# Patient Record
Sex: FEMALE | Race: WHITE | Employment: PART TIME | URBAN - METROPOLITAN AREA
[De-identification: names, ages, dates, MRNs, and addresses within clinical notes are randomized per-mention and may not be internally consistent; named-entity substitution may affect disease eponyms.]

---
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
## 2019-07-03 PROBLEM — R59.0 AXILLARY LYMPHADENOPATHY: Status: ACTIVE | Noted: 2019-07-03

## 2019-07-03 PROBLEM — M79.621 PAIN IN RIGHT AXILLA: Status: ACTIVE | Noted: 2018-05-07

## 2019-12-23 PROBLEM — R55 VASOVAGAL SYNCOPE: Status: ACTIVE | Noted: 2019-12-23

## 2020-01-07 PROBLEM — M21.862 GASTROCNEMIUS EQUINUS, LEFT: Status: ACTIVE | Noted: 2020-01-07

## 2020-01-07 PROBLEM — M21.861 GASTROCNEMIUS EQUINUS, RIGHT: Status: ACTIVE | Noted: 2020-01-07

## 2020-01-07 PROBLEM — M66.872 NONTRAUMATIC TEAR OF TIBIALIS POSTERIOR TENDON, LEFT: Status: ACTIVE | Noted: 2020-01-07

## 2020-01-07 PROBLEM — M66.871 NONTRAUMATIC TEAR OF TIBIALIS POSTERIOR TENDON, RIGHT: Status: ACTIVE | Noted: 2020-01-07

## 2021-05-17 PROBLEM — B36.0 TINEA VERSICOLOR: Status: ACTIVE | Noted: 2021-05-17

## 2022-01-07 PROBLEM — R42 SPELL OF DIZZINESS: Status: ACTIVE | Noted: 2022-01-07

## 2022-01-07 PROBLEM — L70.9 ACNE: Status: ACTIVE | Noted: 2022-01-07

## 2022-03-18 PROBLEM — R59.0 AXILLARY LYMPHADENOPATHY: Status: ACTIVE | Noted: 2019-07-03

## 2022-03-18 PROBLEM — M79.673 FOOT PAIN: Status: ACTIVE | Noted: 2018-05-07

## 2022-03-18 PROBLEM — Z00.00 ENCOUNTER FOR ANNUAL PHYSICAL EXAM: Status: ACTIVE | Noted: 2018-05-07

## 2022-03-18 PROBLEM — M66.871 NONTRAUMATIC TEAR OF TIBIALIS POSTERIOR TENDON, RIGHT: Status: ACTIVE | Noted: 2020-01-07

## 2022-03-18 PROBLEM — M62.462 GASTROCNEMIUS EQUINUS, LEFT: Status: ACTIVE | Noted: 2020-01-07

## 2022-03-18 PROBLEM — M79.621 PAIN IN RIGHT AXILLA: Status: ACTIVE | Noted: 2018-05-07

## 2022-03-18 PROBLEM — R55 VASOVAGAL SYNCOPE: Status: ACTIVE | Noted: 2019-12-23

## 2022-03-19 PROBLEM — M66.872 NONTRAUMATIC TEAR OF TIBIALIS POSTERIOR TENDON, LEFT: Status: ACTIVE | Noted: 2020-01-07

## 2022-03-19 PROBLEM — M21.40 PES PLANUS: Status: ACTIVE | Noted: 2018-05-07

## 2022-03-19 PROBLEM — M62.461 GASTROCNEMIUS EQUINUS, RIGHT: Status: ACTIVE | Noted: 2020-01-07

## 2022-03-19 PROBLEM — L70.9 ACNE: Status: ACTIVE | Noted: 2022-01-07

## 2022-03-19 PROBLEM — Z30.9 CONTRACEPTIVE MANAGEMENT: Status: ACTIVE | Noted: 2018-05-07

## 2022-03-20 PROBLEM — B36.0 TINEA VERSICOLOR: Status: ACTIVE | Noted: 2021-05-17

## 2022-03-20 PROBLEM — R42 SPELL OF DIZZINESS: Status: ACTIVE | Noted: 2022-01-07

## 2022-11-17 ENCOUNTER — OFFICE VISIT (OUTPATIENT)
Dept: FAMILY MEDICINE CLINIC | Age: 22
End: 2022-11-17
Payer: COMMERCIAL

## 2022-11-17 VITALS
HEART RATE: 89 BPM | HEIGHT: 60 IN | RESPIRATION RATE: 18 BRPM | TEMPERATURE: 97.8 F | WEIGHT: 101.5 LBS | DIASTOLIC BLOOD PRESSURE: 74 MMHG | SYSTOLIC BLOOD PRESSURE: 105 MMHG | OXYGEN SATURATION: 99 % | BODY MASS INDEX: 19.93 KG/M2

## 2022-11-17 DIAGNOSIS — D75.89 MACROCYTOSIS: ICD-10-CM

## 2022-11-17 DIAGNOSIS — R55 SYNCOPE, UNSPECIFIED SYNCOPE TYPE: Primary | ICD-10-CM

## 2022-11-17 DIAGNOSIS — N92.6 IRREGULAR MENSES: ICD-10-CM

## 2022-11-17 LAB
HCG URINE, QL. (POC): NEGATIVE
VALID INTERNAL CONTROL?: YES

## 2022-11-17 PROCEDURE — 99213 OFFICE O/P EST LOW 20 MIN: CPT | Performed by: STUDENT IN AN ORGANIZED HEALTH CARE EDUCATION/TRAINING PROGRAM

## 2022-11-17 PROCEDURE — 81025 URINE PREGNANCY TEST: CPT | Performed by: STUDENT IN AN ORGANIZED HEALTH CARE EDUCATION/TRAINING PROGRAM

## 2022-11-17 NOTE — PROGRESS NOTES
[address] [place] [address]   Office [phone], Fax [phone]      Chief Complaint:     Chief Complaint   Patient presents with    [address] Alireza Reeves is a 20 y.o. female that presents for: establish care      Subjective:   HPI:  Jyoti Nam is a 20 y.o. female that presents for:    Hx of syncope:  - passed out and hit head 2 years ago  - went to ED and had workup normal except PACs, negative head CT and EKG  - Hasn't had severe episodes since then  - Previously seen by PCP back home in January for workup   - At that time had a CBC which showed normal hemoglobin but mild increase in MCV. Low normal B12. TSH, folate was normal. CMP was normal except for mildly low glucose. - Will drink water, sit down will usually go away  - Last episode minor several weeks ago    Irregular menses:  - Junel - will message me dosing  - Takes it same time every day   - LMP currently on period    ROS:   Review of Systems   Eyes:         Sees stars before episodes of dizziness   Cardiovascular:  Negative for chest pain. Neurological:  Positive for dizziness. Negative for headaches. Past medical history, social history, medications, and allergies personally reviewed. Past Medical History:   Diagnosis Date    Bilateral pes planus     Pes planus with bilateral foot pain - has been eval. by Podiatry with recommendation for hard orthotics    Constitutional growth delay     Largely resolved    Warts      (sees Dermatology)        Social Hx:   Alcohol: weekends, not every weekend but a few drinks  Tobacco: none  Illicit drug use: none    Medications:   No current outpatient medications on file. No current facility-administered medications for this visit.         Allergies:  No Known Allergies     Health Maintenance:  Health Maintenance Due   Topic Date Due    COVID-19 Vaccine (1) Never done    HPV Age 9Y-34Y (3 - 3-dose series) 04/16/2022    Flu Vaccine (1) Never done Pap Smear  08/16/2022        Objective:   Vitals reviewed. Visit Vitals  /74 (BP 1 Location: Left upper arm, BP Patient Position: Sitting, BP Cuff Size: Adult)   Pulse 89   Temp 97.8 °F (36.6 °C) (Temporal)   Resp 18   Ht 5' 0.43\" (1.535 m)   Wt 101 lb 8 oz (46 kg)   SpO2 99%   BMI 19.54 kg/m²        Physical Exam:  General Alert. No distress. Not diaphoretic. No jaundice, cyanosis, pallor. HENT Head Normocephalic and atraumatic. Eyes Conjunctivae pink, no discharge. No scleral icterus. EOMI. Cardio Normal rate, regular rhythm. No murmur, gallop, or friction rub. No chest wall tenderness. Pulmonary Effort normal. Breath sounds normal. No respiratory distress. No wheezes, rales, or rhonchi. No Stridor. Abdominal Soft. Bowel sounds normal. No distension. No tenderness.  Deferred. Neurological No focal deficits. Skin Skin is warm and dry. No rash noted. No erythema. Psychiatric Mood, affect, and judgment normal.        Assessment/Plan:     1. Syncope, unspecified syncope type  Think this is likely related to dehydration or low blood sugar at time of events. In office orthostatics are normal. Previous EKG per PCP note had PACs. Will obtain Holter monitor. Discussed importance of hydration and eating.  -     CARDIAC HOLTER MONITOR; Future    2. Macrocytosis  Will reassess given history of MCV elevation and low normal B12.  -     VITAMIN B12 & FOLATE; Future    3. Irregular menses  Will send refill for OCP when she sends me exact dosing. UPT today. -     AMB POC URINE PREGNANCY TEST, VISUAL COLOR COMPARISON     Follow up:   Return in about 2 weeks (around 12/1/2022) for pap smear. Pt was discussed with Dr. Celine Evans (attending physician). I have reviewed pertinent labs results and other data. I have discussed the diagnosis with the patient and the intended plan as seen in the above orders.  The patient has received an after-visit summary and questions were answered concerning future plans. I have discussed medication side effects and warnings with the patient as well.     Myla Smith MD  Resident [facility]  11/17/22

## 2022-11-17 NOTE — PROGRESS NOTES
Chief Complaint   Patient presents with    Establish Care     Visit Vitals  /74 (BP 1 Location: Left upper arm, BP Patient Position: Sitting, BP Cuff Size: Adult)   Pulse 89   Temp 97.8 °F (36.6 °C) (Temporal)   Resp 18   Ht 5' 0.43\" (1.535 m)   Wt 101 lb 8 oz (46 kg)   SpO2 99%   BMI 19.54 kg/m²     1. Have you been to the ER, urgent care clinic since your last visit? Hospitalized since your last visit? No    2. Have you seen or consulted any other health care providers outside of the [address] [place] [address] since your last visit? Include any pap smears or colon screening.  No

## 2022-11-18 LAB
FOLATE SERPL-MCNC: 14.3 NG/ML (ref 5–21)
VIT B12 SERPL-MCNC: 245 PG/ML (ref 193–986)

## 2022-11-19 NOTE — PROGRESS NOTES
B12 still low end of normal, will see if patient is vegetarian. Increasing this may help some with symptoms.  Folate normal.

## 2022-12-07 ENCOUNTER — HOSPITAL ENCOUNTER (OUTPATIENT)
Dept: LAB | Age: 22
Discharge: HOME | End: 2022-12-07
Payer: COMMERCIAL

## 2022-12-07 ENCOUNTER — OFFICE VISIT (OUTPATIENT)
Dept: FAMILY MEDICINE CLINIC | Age: 22
End: 2022-12-07

## 2022-12-07 VITALS
WEIGHT: 104.4 LBS | BODY MASS INDEX: 20.5 KG/M2 | SYSTOLIC BLOOD PRESSURE: 103 MMHG | HEART RATE: 87 BPM | DIASTOLIC BLOOD PRESSURE: 65 MMHG | OXYGEN SATURATION: 98 % | RESPIRATION RATE: 16 BRPM | HEIGHT: 60 IN

## 2022-12-07 DIAGNOSIS — R55 SYNCOPE, UNSPECIFIED SYNCOPE TYPE: ICD-10-CM

## 2022-12-07 DIAGNOSIS — N89.8 VAGINAL DISCHARGE: ICD-10-CM

## 2022-12-07 DIAGNOSIS — R87.612 LGSIL ON PAP SMEAR OF CERVIX: ICD-10-CM

## 2022-12-07 DIAGNOSIS — Z23 ENCOUNTER FOR IMMUNIZATION: ICD-10-CM

## 2022-12-07 DIAGNOSIS — Z12.4 ENCOUNTER FOR PAPANICOLAOU SMEAR OF CERVIX: Primary | ICD-10-CM

## 2022-12-07 LAB — WET MOUNT POCT, WMPOCT: NORMAL

## 2022-12-07 PROCEDURE — 88175 CYTOPATH C/V AUTO FLUID REDO: CPT

## 2022-12-07 RX ORDER — NORETHINDRONE ACETATE/ETHINYL ESTRADIOL AND FERROUS FUMARATE 1.5-30(21)
KIT ORAL
COMMUNITY
Start: 2022-11-19

## 2022-12-07 NOTE — PROGRESS NOTES
[address] [place] [address]   Office [phone], Fax [phone]      Chief Complaint:     Chief Complaint   Patient presents with    Gyn Exam     Subjective:   HPI:  Agustina Mujica is a 20 y.o. female that presents for:    Pap smear:   - LSIL last year   - No concerns for STI or abnormal discharge  - On yearly pap schedule     Syncope hx:   - passed out and hit head 2 years ago  - went to ED and had workup normal except PACs, negative head CT and EKG  - Hasn't had severe episodes since then  - Previously seen by PCP back home in January for workup: overall unremarkable  - Orthostatic negative at last OV   - Repeat B12 at our office was lower end of normal  - Encouraged fluid intake and importance of meals to avoid hypoglycemia   - No episodes since last visit; overall feeling well    ROS:   Review of Systems   Genitourinary:         No abnormal discharge   Neurological:  Negative for dizziness and loss of consciousness. Past medical history, social history, medications, and allergies personally reviewed. Past Medical History:   Diagnosis Date    Bilateral pes planus     Pes planus with bilateral foot pain - has been eval. by Podiatry with recommendation for hard orthotics    Constitutional growth delay     Largely resolved    Warts      (sees Dermatology)        Medications:   Current Outpatient Medications   Medication Sig    Kallie Fe 1.5/30, 28, 1.5 mg-30 mcg (21)/75 mg (7) tab      No current facility-administered medications for this visit. Allergies:  No Known Allergies     Health Maintenance:  Health Maintenance Due   Topic Date Due    COVID-19 Vaccine (1) Never done    HPV Age 9Y-34Y (3 - 3-dose series) 04/16/2022    Flu Vaccine (1) Never done    Pap Smear  08/16/2022    - due for third HPV today    Objective:   Vitals reviewed.   Visit Vitals  /65 (BP 1 Location: Right arm, BP Patient Position: Sitting)   Pulse 87   Resp 16   Ht 5' 0.43\" (1.535 m)   Wt 104 lb 6.4 oz (47.4 kg)   SpO2 98%   BMI 20.10 kg/m²        Physical Exam:  General Alert. No distress. Not diaphoretic. No jaundice, cyanosis, pallor. HENT Head Normocephalic and atraumatic. Eyes Conjunctivae pink, no discharge. No scleral icterus. EOMI. Cardio Normal rate, regular rhythm. No murmur, gallop, or friction rub. No chest wall tenderness. Pulmonary Effort normal. Breath sounds normal. No respiratory distress. No wheezes, rales, or rhonchi. No Stridor.  Exam chaperoned by Nilo Michel MA. Increased white discharge noted. No lesions. No ovary enlargement. Neurological No focal deficits. Skin Skin is warm and dry. No rash noted. No erythema. Psychiatric Mood, affect, and judgment normal.        Assessment/Plan:     1. Encounter for Papanicolaou smear of cervix  See below. -     PAP IG, RFX APTIMA HPV ASCUS ([id number]); Future    2. Syncope, unspecified syncope type  Overall improved. Continue hydration efforts. Will return if episodes return. 3. LGSIL on Pap smear of cervix  Discussed repeat pap today, if normal will still need repeat pap in one year. -     PAP IG, RFX APTIMA HPV ASCUS ([id number]); Future    4. Encounter for immunization  Due for last HPV dose (3rd). -     HUMAN PAPILLOMA VIRUS NONAVALENT HPV 3 DOSE IM (GARDASIL 9)  -     ME IMMUNIZ ADMIN,1 SINGLE/COMB VAC/TOXOID    5. Vaginal discharge  Noted on exam but KOH/wet prep negative. Report starting on cycle soon, likely just increase in physiologic discharge. -     AMB POC SMEAR, STAIN & INTERPRET, WET MOUNT     Follow up:   Return in about 1 year (around 12/7/2023) for Repeat pap smear. Pt was discussed with Dr. Berta Ling (attending physician). I have reviewed pertinent labs results and other data. I have discussed the diagnosis with the patient and the intended plan as seen in the above orders. The patient has received an after-visit summary and questions were answered concerning future plans.  I have discussed medication side effects and warnings with the patient as well.     Aleksandra Harper DO  PGY-2 [facility]  12/07/22

## 2022-12-07 NOTE — PROGRESS NOTES
[address] [facility] [address] [facility]   Office [phone], Fax [phone]      Chief Complaint:     Chief Complaint   Patient presents with    Gyn Exam       Subjective:   HPI:  Cathryn Cruz is a 20 y.o. female that presents for:    Pap smear:   - Last year had LSIL   - Repeat due today  - No concerns for STI or abnormal discharge    Syncope hx:   - passed out and hit head 2 years ago  - went to ED and had workup normal except PACs, negative head CT and EKG  - Hasn't had severe episodes since then  - Previously seen by PCP back home in January for workup: overall unremarkable  - Orthostatic negative at last OV   - Repeat B12 at our office was lower end of normal  - Encouraged fluid intake and importance of meals to avoid hypoglycemia   - doing well, no episodes since last office visit    ROS:   Review of Systems   Genitourinary:         No vaginal discharge   Neurological:  Negative for dizziness. Past medical history, social history, medications, and allergies personally reviewed. Past Medical History:   Diagnosis Date    Bilateral pes planus     Pes planus with bilateral foot pain - has been eval. by Podiatry with recommendation for hard orthotics    Constitutional growth delay     Largely resolved    Warts      (sees Dermatology)        Medications:   Current Outpatient Medications   Medication Sig    Kallie Fe 1.5/30, 28, 1.5 mg-30 mcg (21)/75 mg (7) tab      No current facility-administered medications for this visit. Allergies:  No Known Allergies     Health Maintenance:  Health Maintenance Due   Topic Date Due    COVID-19 Vaccine (1) Never done    HPV Age 9Y-34Y (3 - 3-dose series) 04/16/2022    Flu Vaccine (1) Never done    Pap Smear  08/16/2022    - Due for third HPV    Objective:   Vitals reviewed.   Visit Vitals  /65 (BP 1 Location: Right arm, BP Patient Position: Sitting)   Pulse 87   Resp 16   Ht 5' 0.43\" (1.535 m)   Wt 104 lb 6.4 oz (47.4 kg)   SpO2 98%   BMI 20.10 kg/m²        Physical Exam:  General Alert. No distress. Not diaphoretic. No jaundice, cyanosis, pallor. HENT Head Normocephalic and atraumatic. Eyes Conjunctivae pink, no discharge. No scleral icterus. EOMI. Cardio Normal rate, regular rhythm. No murmur, gallop, or friction rub. No chest wall tenderness. Pulmonary Effort normal. Breath sounds normal. No respiratory distress. No wheezes, rales, or rhonchi. No Stridor.  Exam chaperoned by Agustina Alvarado MA. No lesions noted on labia. Thicker white discharge from cervical os. No ovary enlargement on bimanual exam.   Neurological No focal deficits. Skin Skin is warm and dry. No rash noted. No erythema. Psychiatric Mood, affect, and judgment normal.        Assessment/Plan:     1. Encounter for Papanicolaou smear of cervix  -     PAP IG, RFX APTIMA HPV ASCUS ([id number]); Future  Hx of LSIL, repeat pap needed again in one year even if this pap results normal. Discussed with patient. 2. Syncope, unspecified syncope type  Resolved. No further episodes and overall doing well. Discussed continued hydration. 3. LGSIL on Pap smear of cervix  -     PAP IG, RFX APTIMA HPV ASCUS ([id number]); Future  See above. 4. Encounter for immunization  Due for third and final dose of HPV. Discussed risks. Tolerated well. -     HUMAN PAPILLOMA VIRUS NONAVALENT HPV 3 DOSE IM (GARDASIL 9)  -     IL IMMUNIZ ADMIN,1 SINGLE/COMB VAC/TOXOID    5. Vaginal discharge  -     AMB POC SMEAR, STAIN & INTERPRET, WET MOUNT    Noted white vaginal discharge on exam but likely physiologic as KOH/wet prep negative. Follow up:   Return in about 1 year (around 12/7/2023) for Repeat pap smear. Pt was discussed with Dr. Librado Bloom (attending physician). I have reviewed pertinent labs results and other data. I have discussed the diagnosis with the patient and the intended plan as seen in the above orders.  The patient has received an after-visit summary and questions were answered concerning future plans. I have discussed medication side effects and warnings with the patient as well.     Katt Heard DO  PGY-2 Resident - [address] [place] [address]  12/07/22

## 2022-12-07 NOTE — PROGRESS NOTES
Wilber Way is a 20 y.o. female    Chief Complaint   Patient presents with    Gyn Exam       1. Have you been to the ER, urgent care clinic since your last visit? Hospitalized since your last visit? No  2. Have you seen or consulted any other health care providers outside of the [address] [place] [address] since your last visit? Include any pap smears or colon screening.  No    Visit Vitals  /65 (BP 1 Location: Right arm, BP Patient Position: Sitting)   Pulse 87   Resp 16   Ht 5' 0.43\" (1.535 m)   Wt 104 lb 6.4 oz (47.4 kg)   SpO2 98%   BMI 20.10 kg/m²     3 most recent PHQ Screens 11/17/2022   Little interest or pleasure in doing things Not at all   Feeling down, depressed, irritable, or hopeless Not at all   Total Score PHQ 2 0     Health Maintenance Due   Topic Date Due    COVID-19 Vaccine (1) Never done    Flu Vaccine (1) Never done    Pap Smear  08/16/2022

## 2022-12-09 NOTE — PROGRESS NOTES
NILM, repeat pap again next year due to hx of LSIL. If negative then, can return to normal screening intervals.

## 2023-03-16 RX ORDER — NORETHINDRONE ACETATE/ETHINYL ESTRADIOL AND FERROUS FUMARATE 1.5-30(21)
1 KIT ORAL DAILY
Qty: 3 DOSE PACK | Refills: 4 | Status: SHIPPED | OUTPATIENT
Start: 2023-03-16

## 2023-03-16 NOTE — TELEPHONE ENCOUNTER
----- Message from Reagan Cardenass sent at 3/15/2023  8:51 AM EDT -----  Subject: Refill Request    QUESTIONS  Name of Medication? Kallie Moody ., 28, 1.5 mg-30 mcg (21)/75 mg (7) tab  Patient-reported dosage and instructions? one tablet daily   How many days do you have left? 0  Preferred Pharmacy? [address] [facility] phone number (if available)? [phone]  Additional Information for Provider? pt. request a possible 3 mth supply   if possible as well as states that pharmacy sent a request for refill   since script is  may need a new prescription .  ---------------------------------------------------------------------------  --------------  CALL BACK INFO  What is the best way for the office to contact you? OK to leave message on   voicemail  Preferred Call Back Phone Number? [phone]  ---------------------------------------------------------------------------  --------------  SCRIPT ANSWERS  Relationship to Patient?  Self

## 2024-04-05 NOTE — TELEPHONE ENCOUNTER
Medication Refill Request    Dea Oleary is requesting a refill of the following medication(s):   Requested Prescriptions     Pending Prescriptions Disp Refills    TIFFANI FE 1.5/30 1.5-30 MG-MCG tablet [Pharmacy Med Name: Tiffani Fe 1.5/30 1.5-30 MG-MCG Oral Tablet] 28 tablet 0     Sig: Take 1 tablet by mouth once daily        Listed PCP is Lenore Peña DO   Last provider to prescribe medication: Lenore Peña DO   Last Date of Medication Prescribed: 03/16/2023 3 dose pack x 4 refills   Date of Last Office Visit at [facility]: 12/07/2022   FUTURE APPOINTMENT: No future appointments.    Please send refill to:    Health system Pharm[facility] [address] [place] [address] [phone] -  [phone]  [address] [place] [address]  Phone: [phone] Fax: [phone]      Please review request and approve or deny with recommendations.

## 2024-04-12 ENCOUNTER — TELEPHONE (OUTPATIENT)
Age: 24
End: 2024-04-12

## 2024-04-12 RX ORDER — NORETHINDRONE ACETATE AND ETHINYL ESTRADIOL 1.5-30(21)
1 KIT ORAL DAILY
Qty: 4 PACKET | Refills: 0 | Status: SHIPPED | OUTPATIENT
Start: 2024-04-12 | End: 2024-04-12

## 2024-04-12 RX ORDER — NORETHINDRONE ACETATE/ETHINYL ESTRADIOL AND FERROUS FUMARATE 1.5-30(21)
1 KIT ORAL DAILY
Qty: 28 TABLET | Refills: 0 | OUTPATIENT
Start: 2024-04-12

## 2024-04-12 RX ORDER — NORETHINDRONE ACETATE AND ETHINYL ESTRADIOL 1.5-30(21)
1 KIT ORAL DAILY
Qty: 4 PACKET | Refills: 0 | Status: SHIPPED | OUTPATIENT
Start: 2024-04-12

## 2024-04-12 NOTE — TELEPHONE ENCOUNTER
Called and confirmed patient has not missed any doses of birth control. Currently on her menstrual cycle. Refill sent to cover her until new PCP in [place].     Lenore Peña DO  PGY-3 Resident  [facility]

## 2024-06-12 RX ORDER — NORETHINDRONE ACETATE AND ETHINYL ESTRADIOL 1.5-30(21)
1 KIT ORAL DAILY
Qty: 4 PACKET | Refills: 0 | OUTPATIENT
Start: 2024-06-12

## 2024-07-24 ENCOUNTER — PATIENT MESSAGE (OUTPATIENT)
Age: 24
End: 2024-07-24

## 2024-07-24 RX ORDER — NORETHINDRONE ACETATE AND ETHINYL ESTRADIOL 1.5-30(21)
1 KIT ORAL DAILY
Qty: 3 PACKET | Refills: 0 | Status: SHIPPED | OUTPATIENT
Start: 2024-07-24

## 2024-07-24 NOTE — TELEPHONE ENCOUNTER
From: Dea Oleary  To: Brittanie Mcbride MD  Sent: 7/24/2024 7:31 AM EDT  Subject: Birth control refill     Hi there Dr. Stephen,    I know I have not seen you yet which I look forward to. I am wondering if it’s possible to get my birth control filled. I do not have any more refills left from my last doctor. I still have a week and a half left but are hoping I can get it before I run out. Please let me know. I hope you can help!     - Dea Oleary

## 2024-07-24 NOTE — TELEPHONE ENCOUNTER
Medication Refill Request    Dea Oleary is requesting a refill of the following medication(s):   Requested Prescriptions     Pending Prescriptions Disp Refills    norethindrone-ethinyl estradiol-iron (MICROGESTIN FE1.5/30) 1.5-30 MG-MCG tablet 1 packet 0     Sig: Take 1 tablet by mouth daily        Listed PCP is Brittanie Mcbride MD   Last provider to prescribe medication:   Last Date of Medication Prescribed:    Date of Last Office Visit at [facility]:  12/2022  FUTURE APPOINTMENT: No future appointments.    Please send refill to:    [facility] [address] [place] [address] - [address] [facility] [phone] - F [phone]  [address] [facility] [address]  Phone: [phone] Fax: [phone]    [facility] [address] [place], [place] - [address] [facility] [phone] - F [phone]  [address] [facility] [address]  Phone: [phone] Fax: [phone]      Please review request and approve or deny with recommendations.